# Patient Record
Sex: FEMALE | Race: OTHER | HISPANIC OR LATINO | Employment: FULL TIME | URBAN - METROPOLITAN AREA
[De-identification: names, ages, dates, MRNs, and addresses within clinical notes are randomized per-mention and may not be internally consistent; named-entity substitution may affect disease eponyms.]

---

## 2024-05-17 ENCOUNTER — APPOINTMENT (OUTPATIENT)
Dept: RHEUMATOLOGY | Facility: CLINIC | Age: 39
End: 2024-05-17
Payer: MEDICARE

## 2024-06-02 NOTE — PROGRESS NOTES
"Subjective   Patient ID: Ava Canales is a 38 y.o. female who presents for No chief complaint on file..    HPI  Consult   Concerns about possible RA  No records Blank in EMR       ROS      No current outpatient medications on file.     No current facility-administered medications for this visit.         has no past medical history on file.   No past surgical history on file.       family history is not on file.  Pain Management Panel           No data to display                 There were no vitals filed for this visit.  No results found for: \"CBCDIF\", \"CMPLAS\", \"RF\", \"CCPIGGQUAL\", \"SEDRATE\", \"CRP\", \"TSH\"    PHYSICAL EXAM      NODES   HEART  LUNGS  ABDOMEN   VASCULAR  NEURO   SKIN  JOINTS     PLAN  There are no diagnoses linked to this encounter.          "

## 2024-06-03 ENCOUNTER — APPOINTMENT (OUTPATIENT)
Dept: RHEUMATOLOGY | Facility: CLINIC | Age: 39
End: 2024-06-03
Payer: MEDICARE

## 2024-07-19 ENCOUNTER — APPOINTMENT (OUTPATIENT)
Dept: RHEUMATOLOGY | Facility: CLINIC | Age: 39
End: 2024-07-19
Payer: MEDICARE

## 2024-07-24 NOTE — PROGRESS NOTES
"Subjective   Patient ID: Ava Canales is a 38 y.o. female who presents for New Patient Visit (New patient, referred Dr. Hernández. Patient has concerns of Rheumatoid Arthritis. Patient states around Easter she had edema in her ankles, and joint pain on multiples areas of body, along with a reddish rash on her forearms. Steroids did help with the edema, but not with the joint pain. Patient has tried Gabapentin, but it wasn't effective, but Naproxen seems to help. ).    HPI  Consult  \"RA concerns\"    Easter 2024 onset   \"Odd sxs\"  Swelling ankles  Pain ankles and toes  Knees shoulders elbows  PCP labs normal   Rash circles lyme -  Bone scan done joint let up sammie  all joints  Pred helped  Gabapentin easter no better nausea   Aleve help so much better after 2 mos of it   Now gone this red hot ankles and toes on photos       ROS  All systems -    Current Outpatient Medications   Medication Sig Dispense Refill    albuterol 90 mcg/actuation inhaler Inhale 2 puffs every 6 hours if needed.      blood sugar diagnostic strip USE TO TEST DAILY       No current facility-administered medications for this visit.         has no past medical history on file.   No past surgical history on file.   Social History     Tobacco Use    Smoking status: Former     Types: Cigarettes    Smokeless tobacco: Never      family history is not on file.  Pain Management Panel           No data to display                 Vitals:    07/26/24 1525   BP: 114/76   Pulse: 57   SpO2: 100%     No results found for: \"CBCDIF\", \"CMPLAS\", \"RF\", \"CCPIGGQUAL\", \"SEDRATE\", \"CRP\", \"TSH\"    PHYSICAL EXAM      NODES   HEART  LUNGS  ABDOMEN   VASCULAR  NEURO   SKIN+ forearms only   JOINTS all normal no synovial thickening all ue and le joints     PLAN  Diagnoses and all orders for this visit:  Inflammatory polyarthritis (Multi)  -     Citrulline Antibody, IgG; Future  -     Angiotensin Converting Enzyme; Future  -     XR chest 2 views; Future    Patient with an " inflammatory polyarthritis beginning around Easter time 2024  This involved significant pain warmth and swelling of both ankles knees shoulders elbows along with a rash on her forearms.    She was treated by her PCP with prednisone which was helpful but not complete resolution, meloxicam which did not help and finally Aleve recommended by her mother which was significantly helpful in resolving her inflammatory arthritis.    He showed me photos which showed me swelling of various joints especially the ankles  Her sedimentation rate was elevated as well as her platelet count all consistent with an active inflammatory process    She visited a hematologist without any specific diagnosis except for possible iron deficiency anemia which was minor.    Her GINI was negative rheumatoid factor negative and the remainder of her general laboratory negative.  Lyme antibody was negative.    Dx,  Her history photographs and prior lab work are all consistent with an inflammatory polyarthritis of unknown origin or pointing to any specific diagnosis.    The differential diagnosis is broad and of course does include seronegative rheumatoid arthritis.    Thankfully, all of her signs and symptoms except for the rash on both forearms have completely resolved and she is doing well over the past 2 months.    At this point I mention to her that it is impossible to give her a specific diagnosis.  She may or may not have a recurrence and if that happens she should call my office and I will fit her in the schedule.    I have ordered a chest x-ray and ACE in looking for sarcoidosis with ankle synovitis as a frequent presenting signs and symptoms.    Additionally I have ordered an anti-CCP with knowledge of a negative rheumatoid factor in April 2024.

## 2024-07-26 ENCOUNTER — APPOINTMENT (OUTPATIENT)
Dept: RHEUMATOLOGY | Facility: CLINIC | Age: 39
End: 2024-07-26
Payer: MEDICARE

## 2024-07-26 VITALS
WEIGHT: 255 LBS | HEIGHT: 70 IN | DIASTOLIC BLOOD PRESSURE: 76 MMHG | SYSTOLIC BLOOD PRESSURE: 114 MMHG | OXYGEN SATURATION: 100 % | BODY MASS INDEX: 36.51 KG/M2 | HEART RATE: 57 BPM

## 2024-07-26 DIAGNOSIS — M06.4 INFLAMMATORY POLYARTHRITIS (MULTI): Primary | ICD-10-CM

## 2024-07-26 DIAGNOSIS — R21 RASH AND OTHER NONSPECIFIC SKIN ERUPTION: ICD-10-CM

## 2024-07-26 PROCEDURE — 99204 OFFICE O/P NEW MOD 45 MIN: CPT | Performed by: INTERNAL MEDICINE

## 2024-07-26 PROCEDURE — 1036F TOBACCO NON-USER: CPT | Performed by: INTERNAL MEDICINE

## 2024-07-26 PROCEDURE — 3008F BODY MASS INDEX DOCD: CPT | Performed by: INTERNAL MEDICINE

## 2024-07-26 RX ORDER — ALBUTEROL SULFATE 90 UG/1
2 AEROSOL, METERED RESPIRATORY (INHALATION) EVERY 6 HOURS PRN
COMMUNITY
Start: 2022-11-03

## 2024-08-02 ENCOUNTER — LAB (OUTPATIENT)
Dept: LAB | Facility: LAB | Age: 39
End: 2024-08-02
Payer: MEDICARE

## 2024-08-02 ENCOUNTER — HOSPITAL ENCOUNTER (OUTPATIENT)
Dept: RADIOLOGY | Facility: CLINIC | Age: 39
Discharge: HOME | End: 2024-08-02
Payer: MEDICARE

## 2024-08-02 DIAGNOSIS — M06.4 INFLAMMATORY POLYARTHRITIS (MULTI): ICD-10-CM

## 2024-08-02 PROCEDURE — 82164 ANGIOTENSIN I ENZYME TEST: CPT

## 2024-08-02 PROCEDURE — 36415 COLL VENOUS BLD VENIPUNCTURE: CPT

## 2024-08-02 PROCEDURE — 71046 X-RAY EXAM CHEST 2 VIEWS: CPT

## 2024-08-02 PROCEDURE — 86200 CCP ANTIBODY: CPT

## 2024-08-03 LAB — CCP IGG SERPL-ACNC: <1 U/ML

## 2024-08-04 LAB — ACE SERPL-CCNC: 31 U/L (ref 16–85)

## 2025-01-09 ENCOUNTER — OFFICE VISIT (OUTPATIENT)
Dept: URGENT CARE | Facility: URGENT CARE | Age: 40
End: 2025-01-09
Payer: COMMERCIAL

## 2025-01-09 VITALS
OXYGEN SATURATION: 98 % | DIASTOLIC BLOOD PRESSURE: 83 MMHG | SYSTOLIC BLOOD PRESSURE: 130 MMHG | RESPIRATION RATE: 18 BRPM | WEIGHT: 276.02 LBS | BODY MASS INDEX: 40.18 KG/M2 | HEART RATE: 79 BPM | TEMPERATURE: 98.5 F

## 2025-01-09 DIAGNOSIS — J45.21 MILD INTERMITTENT ASTHMA WITH EXACERBATION (HHS-HCC): Primary | ICD-10-CM

## 2025-01-09 DIAGNOSIS — J02.9 SORE THROAT: ICD-10-CM

## 2025-01-09 LAB
POC RAPID INFLUENZA A: NEGATIVE
POC RAPID INFLUENZA B: NEGATIVE
POC RAPID STREP: NEGATIVE
POC SARS-COV-2 AG BINAX: NORMAL

## 2025-01-09 PROCEDURE — 87880 STREP A ASSAY W/OPTIC: CPT | Performed by: PHYSICIAN ASSISTANT

## 2025-01-09 PROCEDURE — 99204 OFFICE O/P NEW MOD 45 MIN: CPT | Performed by: PHYSICIAN ASSISTANT

## 2025-01-09 PROCEDURE — 87811 SARS-COV-2 COVID19 W/OPTIC: CPT | Performed by: PHYSICIAN ASSISTANT

## 2025-01-09 PROCEDURE — 1036F TOBACCO NON-USER: CPT | Performed by: PHYSICIAN ASSISTANT

## 2025-01-09 PROCEDURE — 87804 INFLUENZA ASSAY W/OPTIC: CPT | Performed by: PHYSICIAN ASSISTANT

## 2025-01-09 RX ORDER — INHALER, ASSIST DEVICES
SPACER (EA) MISCELLANEOUS
Qty: 1 EACH | Refills: 0 | Status: SHIPPED | OUTPATIENT
Start: 2025-01-09 | End: 2026-01-09

## 2025-01-09 RX ORDER — FLUTICASONE PROPIONATE 44 UG/1
1 AEROSOL, METERED RESPIRATORY (INHALATION)
Qty: 10.6 G | Refills: 0 | Status: SHIPPED | OUTPATIENT
Start: 2025-01-09 | End: 2025-01-19

## 2025-01-09 RX ORDER — ALBUTEROL SULFATE 90 UG/1
2 INHALANT RESPIRATORY (INHALATION) EVERY 6 HOURS PRN
Qty: 8.5 G | Refills: 0 | Status: SHIPPED | OUTPATIENT
Start: 2025-01-09 | End: 2026-01-09

## 2025-01-09 NOTE — PATIENT INSTRUCTIONS
ASTHMA ACTION PLAN:      Green ZONE well without symptoms.      YELLOW ZONE start of runny nose, congestion, cough, wheeze. Start Albuterol inhaler with spacer 2 puffs every 6 hours x 24 hours then as needed for cough or wheeze.  Be cautious with NSAIDS during asthma exacerbation, use Tylenol as needed.   Start Flovent inhaler 1 puff twice a day x 7-10 days, rinse mouth with each use.    RED ZONE difficulty breathing. Albuterol inhaler with spacer 6 puffs every 20 min x 2 if no improvement go to ER or call Ambulance. If improved after second treatment start 4 puffs every 6hours x 24 hours then 2 puffs every 6 hours x 24 hours and get in to see Family Doctor to assess for need of oral steroids.     Acute pharyngitis- Rapid strep, Rapid covid and influenza negative. Repeat covid swab on day 3 and 5. Supportive care with   warm salt water gargles, saline nasal spray every 2 hours as needed congestion, Humidifier, Vicks Chest RUB, OTC expectorant such as Robitussin or Mucinex with plenty of fluids, Hydration with Pedialyte or water, Trial of Flonase nasal spray 2 sprays each nostril daily or 1 spray twice a day x 3 days and zyrtec 10mg daily x 1 week    Headache due to anxiety and illness- recommend Cognitive Behavioral Therapy. Advised to ER if headache and neck stiffness and fever 103.

## 2025-01-09 NOTE — PROGRESS NOTES
Subjective   Patient ID: Ava Canales is a 39 y.o. female with autism present today with a chief complaint of Illness (Sore throat, headache since yesterday, trouble breathing at times, difficulty swallowing, fatigue x1 day).    History of Present Illness    Illness    Pt with hx of asthma and reports cough with difficulty breathing described as pressure on chest, used rescue inhaler which helped and slept reclined position. Pt reports headache in forehead since yesterday, constant. Pt gets headaches couple times a month when over stimulated but usually resolves with ibuprofen however not yesterday. Pt gets anxiety with large crowds. Pt quit smoking marijuana 2021 and tobacco 2019.   Past Medical History  Allergies as of 01/09/2025 - Reviewed 01/09/2025   Allergen Reaction Noted    Bee sting kit Anaphylaxis 09/28/2016    Shellfish containing products Anaphylaxis 09/28/2016       (Not in a hospital admission)       No past medical history on file.    No past surgical history on file.     reports that she has quit smoking. Her smoking use included cigarettes. She has never used smokeless tobacco.    Review of Systems  Review of Systems                               Objective    Vitals:    01/09/25 0941   BP: 130/83   BP Location: Left arm   Patient Position: Sitting   Pulse: 79   Resp: 18   Temp: 36.9 °C (98.5 °F)   TempSrc: Oral   SpO2: 98%   Weight: 125 kg (276 lb 0.3 oz)     Patient's last menstrual period was 12/17/2024 (exact date).    Physical Exam  Vitals and nursing note reviewed.   Constitutional:       Appearance: Normal appearance. She is not ill-appearing.   HENT:      Head: Normocephalic and atraumatic.      Right Ear: Tympanic membrane normal.      Left Ear: Tympanic membrane normal.      Nose: Congestion and rhinorrhea present.      Mouth/Throat:      Mouth: Mucous membranes are moist.      Pharynx: Posterior oropharyngeal erythema present.   Eyes:      Extraocular Movements: Extraocular movements  intact.      Conjunctiva/sclera: Conjunctivae normal.      Pupils: Pupils are equal, round, and reactive to light.   Cardiovascular:      Rate and Rhythm: Normal rate and regular rhythm.      Heart sounds: No murmur heard.  Pulmonary:      Effort: Pulmonary effort is normal. No respiratory distress.      Breath sounds: No wheezing.      Comments: Mildly decreased breath sounds  Musculoskeletal:         General: Normal range of motion.      Cervical back: Normal range of motion and neck supple.   Lymphadenopathy:      Cervical: No cervical adenopathy.   Skin:     General: Skin is warm and dry.      Findings: No rash.   Neurological:      General: No focal deficit present.      Mental Status: She is alert.   Psychiatric:         Mood and Affect: Mood normal.         Procedures    Point of Care Test & Imaging Results from this visit  Results for orders placed or performed in visit on 01/09/25   POCT Covid-19 Rapid Antigen   Result Value Ref Range    POC DAE-COV-2 AG  Presumptive negative test for SARS-CoV-2 (no antigen detected)     Presumptive negative test for SARS-CoV-2 (no antigen detected)   POCT Influenza A/B manually resulted   Result Value Ref Range    POC Rapid Influenza A Negative Negative    POC Rapid Influenza B Negative Negative   POCT rapid strep A manually resulted   Result Value Ref Range    POC Rapid Strep Negative Negative      No results found.    Diagnostic study results (if any) were reviewed by Herminia Yang PA-C.    Assessment/Plan   Allergies, medications, history, and pertinent labs/EKGs/Imaging reviewed by Herminia Yang PA-C.     Medical Decision Making  40 yo F with autism and hx of asthma and reports cough with difficulty breathing described as pressure on chest, used rescue inhaler which helped and slept reclined position. Pt reports headache in forehead since yesterday, constant. Pt gets headaches couple times a month when over stimulated but usually resolves with  ibuprofen however not yesterday. Also with sore throat and fatigue since yesterday.  Reviewed vitals stable. Exam remarkable for mild nasal congestion, rhinorrhea, pharyngeal erythema w/o exudate, mildly diminished breath sounds.    Discussed asthma exacerbation- advised to Start Albuterol inhaler with spacer 2 puffs every 6 hours x 24 hours then as needed for cough or wheeze.  Be cautious with NSAIDS during asthma exacerbation, use Tylenol as needed.   Insurance did not cover Flovent inhaler and was switched to Pulmicort 1 puff twice a day x 7-10 days, rinse mouth with each use.    Acute pharyngitis- Rapid strep, Rapid covid and influenza negative. Repeat covid swab on day 3 and 5. Supportive care with warm salt water gargles, saline nasal spray every 2 hours as needed congestion, Humidifier, Vicks Chest RUB, OTC expectorant such as Robitussin or Mucinex with plenty of fluids, Hydration with Pedialyte or water, Trial of Flonase nasal spray 2 sprays each nostril daily or 1 spray twice a day x 3 days and zyrtec 10mg daily x 1 week    Headache due to anxiety and illness- recommend Cognitive Behavioral Therapy. Advised to ER if headache and neck stiffness and fever 103.    Orders and Diagnoses  Diagnoses and all orders for this visit:  Mild intermittent asthma with exacerbation ([facility])  -     inhalational spacing device (BreatheRite MDI Spacer) inhaler; Use as instructed with albuterol inhaler  -     fluticasone (Flovent HFA) 44 mcg/actuation inhaler; Inhale 1 puff 2 times a day for 10 days. Rinse mouth with water after use to reduce aftertaste and incidence of candidiasis. Do not swallow.  -     albuterol (ProAir HFA) 90 mcg/actuation inhaler; Inhale 2 puffs every 6 hours if needed for wheezing or shortness of breath.  Sore throat  -     POCT Covid-19 Rapid Antigen  -     POCT Influenza A/B manually resulted  -     POCT rapid strep A manually resulted      Medical Admin Record      Patient disposition:  Home    Electronically signed by Herminia Yang PA-C  6:02 PM